# Patient Record
Sex: FEMALE | Race: BLACK OR AFRICAN AMERICAN | Employment: STUDENT | URBAN - METROPOLITAN AREA
[De-identification: names, ages, dates, MRNs, and addresses within clinical notes are randomized per-mention and may not be internally consistent; named-entity substitution may affect disease eponyms.]

---
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
## 2018-02-07 ENCOUNTER — OFFICE VISIT (OUTPATIENT)
Dept: SURGERY | Age: 18
End: 2018-02-07

## 2018-02-07 VITALS
HEART RATE: 74 BPM | HEIGHT: 70 IN | OXYGEN SATURATION: 99 % | SYSTOLIC BLOOD PRESSURE: 108 MMHG | TEMPERATURE: 97.2 F | BODY MASS INDEX: 18.3 KG/M2 | WEIGHT: 127.8 LBS | DIASTOLIC BLOOD PRESSURE: 73 MMHG

## 2018-02-07 DIAGNOSIS — N63.20 LEFT BREAST MASS: Primary | ICD-10-CM

## 2018-02-07 DIAGNOSIS — N89.6 INTACT HYMENAL RING: ICD-10-CM

## 2018-02-07 DIAGNOSIS — N94.6 SEVERE DYSMENORRHEA: ICD-10-CM

## 2018-02-07 RX ORDER — IBUPROFEN 800 MG/1
TABLET ORAL
Qty: 40 TAB | Refills: 5 | Status: SHIPPED | OUTPATIENT
Start: 2018-02-07

## 2018-02-07 NOTE — PROGRESS NOTES
SUBJECTIVE: Ramiro Angel is a 21 y.o. female [id number] (Abortions 0, Miscarriages 0), who presents with a lump in left breast for about 5 months and not painful. Patient's last menstrual period was 2018. No Known Allergies     History reviewed. No pertinent past medical history. Past Surgical History:   Procedure Laterality Date    HX ATRIAL SEPTAL DEFECT REPAIR         OB History     No data available          History reviewed. No pertinent family history. Social History     Social History    Marital status: SINGLE     Spouse name: N/A    Number of children: N/A    Years of education: N/A     Occupational History    Not on file. Social History Main Topics    Smoking status: Never Smoker    Smokeless tobacco: Never Used    Alcohol use No    Drug use: No    Sexual activity: No     Other Topics Concern    Not on file     Social History Narrative       Current Outpatient Prescriptions   Medication Sig Dispense Refill    ibuprofen (MOTRIN) 800 mg tablet TAKE ONE TABLET BY MOUTH EVERY 8 HOURS AS NEEDED FOR PAIN **GENERIC FOR: MOTRIN 40 Tab 5    aspirin 81 mg chewable tablet Take 81 mg by mouth daily. Review of Systems:   Constitutional: No weight change, chills or fever, anorexia, weakness or sleep disturbance . Cardiovascular: No chest pain, shortness of breath, or palpitations . Respiratory: No cough, shortness of breath, hemoptysis, or orthopnea . Neurologic: No syncope, headaches or seizures . Hematologic: No easy bruising or unusual bleeding . Psychiatric: No insomnia, confusion, depression, or anxiety . GI:No nausea and vomiting, diarrhea or constipation  . : See HPI . Musculoskeletal: No joint pain or muscle pain . Endocrine: No polydipsia, polyuria, cold intolerance, excessive fatigue, or sleep disturbance . Integumentary: No breast pain, lumps, nipple discharge, or axillary lumps .     Objective:     Visit Vitals    /73    Pulse 74    Temp 97.2 °F (36.2 °C) (Temporal)    Ht 5' 10\" (1.778 m)    Wt 127 lb 12.8 oz (58 kg)    LMP 02/01/2018    SpO2 99%    BMI 18.34 kg/m2       General:  alert, cooperative, no distress, appears stated age   Skin:  no rash or abnormalities   Eyes: negative   Mouth: MMM no lesions   Lymph Nodes:  Cervical, supraclavicular, and axillary nodes normal.   Breast Exam: Right breast negative and left breast reveals a 4-5 cm mass that is freely mobile in UOQ and slightly tender. Lungs:  clear to auscultation bilaterally   Heart:  regular rate and rhythm   Abdomen: soft, non-tender. Bowel sounds normal. No masses,  no organomegaly   Back:  Costovertebral angle tenderness absent   Genitourinary: Pelvic exam: exam declined by the patient    Extremities:  extremities normal, atraumatic, no cyanosis or edema   Neurologic:  sensation grossly intact. Psychiatric:  non focal     ASSESSMENT:      ICD-10-CM ICD-9-CM    1. Left breast mass N63.20 611.72 REFERRAL TO SURGERY   2. Severe dysmenorrhea N94.6 625.3 ibuprofen (MOTRIN) 800 mg tablet   3. Intact hymenal ring N89.6 623.3         Follow-up Disposition:  Return in about 1 year (around 2/7/2019), or if symptoms worsen or fail to improve.

## 2018-02-07 NOTE — MR AVS SNAPSHOT
[address] [id number] [phone] Patient: Coreen Gutierrez MRN: [id number] FFY:[birth year]70[birth year] Visit Information Date & Time Provider Department Dept. Phone Encounter #  
 2/7/2018  2:15 PM Carisa Flores, [facility] Surgical [address] [id number] Follow-up Instructions Return in about 1 year (around 2/7/2019), or if symptoms worsen or fail to improve. Upcoming Health Maintenance Date Due Hepatitis A Peds Age 1-18 (1 of 2 - Standard Series) 7/23/1999 DTaP/Tdap/Td series (1 - Tdap) 7/23/2005 HPV AGE 9Y-26Y (1 of 3 - Female 3 Dose Series) 7/23/2009 Influenza Age 5 to Adult 8/1/2017 Allergies as of 2/7/2018  Review Complete On: 2/7/2018 By: Carisa Flores MD  
 No Known Allergies Current Immunizations  Never Reviewed No immunizations on file. Not reviewed this visit You Were Diagnosed With   
  
 Codes Comments Left breast mass    -  Primary ICD-10-CM: N63.20 ICD-9-CM: 611.72 Severe dysmenorrhea     ICD-10-CM: N94.6 ICD-9-CM: 625.3 Intact hymenal ring     ICD-10-CM: N89.6 ICD-9-CM: 623.3 Vitals BP Pulse Temp Height(growth percentile) Weight(growth percentile) LMP  
 108/73 (33 %/ 75 %)* 74 97.2 °F (36.2 °C) (Temporal) 5' 10\" (1.778 m) (99 %, Z= 2.25) 127 lb 12.8 oz (58 kg) (50 %, Z= 0.01) 02/01/2018 SpO2 BMI OB Status Smoking Status 99% 18.34 kg/m2 (9 %, Z= -1.35) Having regular periods Never Smoker *BP percentiles are based on NHBPEP's 4th Report Growth percentiles are based on CDC 2-20 Years data. Vitals History BMI and BSA Data Body Mass Index Body Surface Area  
 18.34 kg/m 2 1.69 m 2 Preferred Pharmacy Pharmacy Name Phone [facility] [address] [phone] Your Updated Medication List  
  
   
 This list is accurate as of: 2/7/18  2:55 PM.  Always use your most recent med list.  
  
  
  
  
 aspirin 81 mg chewable tablet Take 81 mg by mouth daily. ibuprofen 800 mg tablet Commonly known as:  MOTRIN  
TAKE ONE TABLET BY MOUTH EVERY 8 HOURS AS NEEDED FOR PAIN **GENERIC FOR: MOTRIN Prescriptions Sent to Pharmacy Refills  
 ibuprofen (MOTRIN) 800 mg tablet 5 Sig: TAKE ONE TABLET BY MOUTH EVERY 8 HOURS AS NEEDED FOR PAIN **GENERIC FOR: MOTRIN Class: Normal  
 Pharmacy: [facility] [address] [facility] #: [phone] We Performed the Following REFERRAL TO SURGERY [XGB740 Custom] Comments:  
 Please evaluate patient for left breast mass. Follow-up Instructions Return in about 1 year (around 2/7/2019), or if symptoms worsen or fail to improve. Referral Information Referral ID Referred By Referred To  
  
 [id number] Cassy LOCKETT MD   
   [facility] [place] [address] [facility] Phone: [phone] Fax: [phone] Visits Status Start Date End Date 1 New Request 2/7/18 2/7/19 If your referral has a status of pending review or denied, additional information will be sent to support the outcome of this decision. Introducing [facility] & HEALTH SERVICES! Anamika Almonte introduces [employer] patient portal. Now you can access parts of your medical record, email your doctor's office, and request medication refills online. 1. In your internet browser, go to [email or web address] 2. Click on the First Time User? Click Here link in the Sign In box. You will see the New Member Sign Up page. 3. Enter your [employer] Access Code exactly as it appears below. You will not need to use this code after youve completed the sign-up process. If you do not sign up before the expiration date, you must request a new code. · Adyuka Access Code: [email or web address] Expires: 5/8/2018  2:34 PM 
 
4. Enter the last four digits of your Social Security Number (xxxx) and Date of Birth (mm/dd/yyyy) as indicated and click Submit. You will be taken to the next sign-up page. 5. Create a Adyuka ID. This will be your Adyuka login ID and cannot be changed, so think of one that is secure and easy to remember. 6. Create a Adyuka password. You can change your password at any time. 7. Enter your Password Reset Question and Answer. This can be used at a later time if you forget your password. 8. Enter your e-mail address. You will receive e-mail notification when new information is available in [address] 9. Click Sign Up. You can now view and download portions of your medical record. 10. Click the Download Summary menu link to download a portable copy of your medical information. If you have questions, please visit the Frequently Asked Questions section of the Adyuka website. Remember, Adyuka is NOT to be used for urgent needs. For medical emergencies, dial 911. Now available from your iPhone and Android! Please provide this summary of care documentation to your next provider. If you have any questions after today's visit, please call [phone].

## 2018-02-09 ENCOUNTER — OFFICE VISIT (OUTPATIENT)
Dept: SURGERY | Age: 18
End: 2018-02-09

## 2018-02-09 VITALS
WEIGHT: 123 LBS | SYSTOLIC BLOOD PRESSURE: 118 MMHG | OXYGEN SATURATION: 100 % | BODY MASS INDEX: 17.61 KG/M2 | HEIGHT: 70 IN | HEART RATE: 75 BPM | DIASTOLIC BLOOD PRESSURE: 82 MMHG

## 2018-02-09 DIAGNOSIS — N63.21 MASS OF UPPER OUTER QUADRANT OF LEFT BREAST: Primary | ICD-10-CM

## 2018-02-09 RX ORDER — DOXYCYCLINE 100 MG/1
100 CAPSULE ORAL 2 TIMES DAILY
COMMUNITY
End: 2018-04-18 | Stop reason: ALTCHOICE

## 2018-02-09 RX ORDER — HYDROXYCHLOROQUINE SULFATE 200 MG/1
200 TABLET, FILM COATED ORAL DAILY
COMMUNITY

## 2018-02-09 NOTE — PROGRESS NOTES
Limited Ultrasound of the left breast    Procedure:  Ultrasound of left breast  Indication:  left breast mass at 2 o'clock   Surgeon:  Mumtaz Francois MD FACS  Procedure:  Utilizing a Tinychat Nemio 20 high frequency linear array transducer the left  breast was scanned and images obtained with special attention to the 2 o'clock position  Findings:  Hypoechoic homogeneous smoothly marginated 4.1 x 1.7 x 3.6 cm mass with bilateral shadowing at 2 o'clock 9 centimeters from the nipple  Impression:  Probably benign  BIRADS: 3-4  Recommend:  Observation with repeat US in 6 months vs core biopsy at patient discretion    Mumtaz Francois MD FACS

## 2018-02-09 NOTE — PROGRESS NOTES
HISTORY OF PRESENT ILLNESS  Bo De Guzman is a 21 y.o. female who comes in for consultation by Dr Casey Borges for a breast mass  HPI  She has noted a lump in the UOQ of the left breast for 4-5 months. It has not changed in size and is slightly tender. The size nor discomfort does not vary with menstrual cycle. She denies family hx breast cancer, ovarian cancer. She had menarche at 3, and is nulliparous. Her LMP was 2/1. Past Medical History:   Diagnosis Date    Arthritis     Autoimmune disease ([address]     Chronic pain     Psychotic disorder      Past Surgical History:   Procedure Laterality Date    HX ATRIAL SEPTAL DEFECT REPAIR  2003     Family History   Problem Relation Age of Onset   [facility] Stroke Mother     Hypertension Sister      Social History   Substance Use Topics    Smoking status: Never Smoker    Smokeless tobacco: Never Used    Alcohol use No     Current Outpatient Prescriptions   Medication Sig    doxycycline (MONODOX) 100 mg capsule Take 100 mg by mouth two (2) times a day.  hydroxychloroquine (PLAQUENIL) 200 mg tablet Take 200 mg by mouth daily.  ibuprofen (MOTRIN) 800 mg tablet TAKE ONE TABLET BY MOUTH EVERY 8 HOURS AS NEEDED FOR PAIN **GENERIC FOR: MOTRIN    aspirin 81 mg chewable tablet Take 81 mg by mouth daily. No current facility-administered medications for this visit. Allergies   Allergen Reactions    Latex Rash       Review of Systems   Constitutional: Negative for chills, diaphoresis, fever, malaise/fatigue and weight loss. HENT: Negative for congestion, ear pain and sore throat. Eyes: Negative for blurred vision and pain. Respiratory: Positive for shortness of breath. Negative for cough, hemoptysis, sputum production, wheezing and stridor. Cardiovascular: Negative for chest pain, palpitations, orthopnea, claudication, leg swelling and PND. Gastrointestinal: Positive for heartburn.  Negative for abdominal pain, blood in stool, constipation, diarrhea, melena, nausea and vomiting. Genitourinary: Negative for dysuria, flank pain, frequency, hematuria and urgency. Musculoskeletal: Positive for myalgias. Negative for back pain, joint pain and neck pain. Skin: Negative for itching and rash. Neurological: Positive for headaches. Negative for dizziness, tremors, focal weakness, seizures and weakness. Endo/Heme/Allergies: Negative for polydipsia. Psychiatric/Behavioral: Negative for depression and memory loss. The patient is nervous/anxious. Visit Vitals    /82 (BP 1 Location: Right arm, BP Patient Position: Sitting)    Pulse 75    Ht 5' 10\" (1.778 m)    Wt 55.8 kg (123 lb)    LMP 02/01/2018    SpO2 100%    BMI 17.65 kg/m2       Physical Exam   Constitutional: She is oriented to person, place, and time. She appears well-developed and well-nourished. No distress. HENT:   Head: Normocephalic and atraumatic. Mouth/Throat: Oropharynx is clear and moist. No oropharyngeal exudate. Eyes: Conjunctivae and EOM are normal. Pupils are equal, round, and reactive to light. No scleral icterus. Neck: Normal range of motion. Neck supple. No JVD present. No tracheal deviation present. No thyromegaly present. Cardiovascular: Normal rate and regular rhythm. Exam reveals no gallop and no friction rub. No murmur heard. Pulmonary/Chest: Effort normal and breath sounds normal. No respiratory distress. She has no wheezes. She has no rales. Right breast exhibits no inverted nipple, no mass, no nipple discharge, no skin change and no tenderness. Left breast exhibits mass (5 cm mobile mass in UOQ). Left breast exhibits no inverted nipple, no nipple discharge, no skin change and no tenderness. Breasts are symmetrical (medium, ptotic). Abdominal: Soft. Bowel sounds are normal. She exhibits no distension and no mass. There is no tenderness. There is no rebound and no guarding. Musculoskeletal: Normal range of motion. She exhibits no edema. Lymphadenopathy:     She has no cervical adenopathy. She has axillary adenopathy (shoddy bilaterally ). Right: No supraclavicular adenopathy present. Left: No supraclavicular adenopathy present. Neurological: She is alert and oriented to person, place, and time. No cranial nerve deficit. Skin: Skin is warm and dry. No rash noted. She is not diaphoretic. No erythema. No pallor. Psychiatric: Her speech is normal and behavior is normal. Judgment and thought content normal. Her mood appears anxious. ASSESSMENT and PLAN  1. Left breast mass 0200. I explained to her and her mother about the anatomy and pathophysiology of breast masses and likely benign process in a young woman but the possibility of malignancy. Options for observation, further work up, possible biopsy and possible excision were discussed. 2.  Anxiety    We did an US in the office today suggestion a solid mass. This is most likely a fibroadenoma or phylloides tumor.   She desires a biopsy but is going on a trip and we will arrange US core bx on her return in the office    My Pina MD FACS

## 2018-02-09 NOTE — MR AVS SNAPSHOT
[address] [facility], [address] [place] [address] [facility] 
[phone] Patient: Coreen Gutierrez MRN: [id number] GMS:[birth year] Visit Information Date & Time Provider Department Dept. Phone Encounter #  
 2/9/2018  9:20 AM Kingsley Benson MD [facility] [place] [address] [id number] Your Appointments 3/2/2018  1:30 PM  
OFFICE SURGERY with Kingsley Benson MD  
[facility] Dr. Kip Davis [facility] ([address]) Appt Note: ultrasound guided core biopsy of the Lt breast  
 [address] [facility], [address] [place] [address], [address] [phone]  
[address] [facility], [address] [place] [address] [address] [phone] Upcoming Health Maintenance Date Due Hepatitis A Peds Age 1-18 (1 of 2 - Standard Series) 7/23/1999 DTaP/Tdap/Td series (1 - Tdap) 7/23/2005 HPV AGE 9Y-26Y (1 of 3 - Female 3 Dose Series) 7/23/2009 Influenza Age 5 to Adult 8/1/2017 Allergies as of 2/9/2018  Review Complete On: 2/9/2018 By: Kingsley Benson MD  
  
 Severity Noted Reaction Type Reactions Latex  02/09/2018    Rash Current Immunizations  Never Reviewed No immunizations on file. Not reviewed this visit Vitals BP Pulse Height(growth percentile) Weight(growth percentile) LMP  
 118/82 (69 %/ 93 %)* (BP 1 Location: Right arm, BP Patient Position: Sitting) 75 5' 10\" (1.778 m) (99 %, Z= 2.25) 123 lb (55.8 kg) (41 %, Z= -0.23) 02/01/2018 SpO2 BMI OB Status Smoking Status 100% 17.65 kg/m2 (4 %, Z= -1.74) Having regular periods Never Smoker *BP percentiles are based on NHBPEP's 4th Report Growth percentiles are based on CDC 2-20 Years data. Vitals History BMI and BSA Data Body Mass Index Body Surface Area  
 17.65 kg/m 2 1.66 m 2 Preferred Pharmacy Pharmacy Name Phone [facility] [facility] [phone] Your Updated Medication List  
  
   
This list is accurate as of: 18 10:05 AM.  Always use your most recent med list.  
  
  
  
  
 aspirin 81 mg chewable tablet Take 81 mg by mouth daily. doxycycline 100 mg capsule Commonly known as:  Blondie England Take 100 mg by mouth two (2) times a day. ibuprofen 800 mg tablet Commonly known as:  MOTRIN  
TAKE ONE TABLET BY MOUTH EVERY 8 HOURS AS NEEDED FOR PAIN **GENERIC FOR: MOTRIN  
  
 PLAQUENIL 200 mg tablet Generic drug:  hydroxychloroquine Take 200 mg by mouth daily. Introducing [facility] & HEALTH SERVICES! Candy Lara introduces [email or web address] patient portal. Now you can access parts of your medical record, email your doctor's office, and request medication refills online. 1. In your internet browser, go to [email or web address] 2. Click on the First Time User? Click Here link in the Sign In box. You will see the New Member Sign Up page. 3. Enter your [email or web address] Access Code exactly as it appears below. You will not need to use this code after youve completed the sign-up process. If you do not sign up before the expiration date, you must request a new code. · [email or web address] Access Code: [email or web address] Expires: 2018  2:34 PM 
 
4. Enter the last four digits of your Social Security Number (xxxx) and Date of Birth (mm/dd/yyyy) as indicated and click Submit. You will be taken to the next sign-up page. 5. Create a [email or web address] ID. This will be your [email or web address] login ID and cannot be changed, so think of one that is secure and easy to remember. 6. Create a [email or web address] password. You can change your password at any time. 7. Enter your Password Reset Question and Answer. This can be used at a later time if you forget your password. 8. Enter your e-mail address. You will receive e-mail notification when new information is available in [address] 9. Click Sign Up. You can now view and download portions of your medical record. 10. Click the Download Summary menu link to download a portable copy of your medical information. If you have questions, please visit the Frequently Asked Questions section of the Aeromics website. Remember, Aer[employer] is NOT to be used for urgent needs. For medical emergencies, dial 911. Now available from your iPhone and Android! Please provide this summary of care documentation to your next provider. Your primary care clinician is listed as Shonna Davidson. If you have any questions after today's visit, please call [phone].

## 2018-03-01 ENCOUNTER — TELEPHONE (OUTPATIENT)
Dept: SURGERY | Age: 18
End: 2018-03-01

## 2018-03-01 NOTE — TELEPHONE ENCOUNTER
Spoke with patient's mother and moved her appt for breast biopsy 11am vs 1:30pm on 03/02/2018. She verbalized understanding.

## 2018-03-02 ENCOUNTER — OFFICE VISIT (OUTPATIENT)
Dept: SURGERY | Age: 18
End: 2018-03-02

## 2018-03-02 ENCOUNTER — TELEPHONE (OUTPATIENT)
Dept: SURGERY | Age: 18
End: 2018-03-02

## 2018-03-02 ENCOUNTER — HOSPITAL ENCOUNTER (OUTPATIENT)
Dept: LAB | Age: 18
Discharge: HOME | End: 2018-03-02

## 2018-03-02 VITALS
HEIGHT: 70 IN | OXYGEN SATURATION: 99 % | DIASTOLIC BLOOD PRESSURE: 65 MMHG | WEIGHT: 122 LBS | SYSTOLIC BLOOD PRESSURE: 122 MMHG | BODY MASS INDEX: 17.47 KG/M2 | HEART RATE: 70 BPM

## 2018-03-02 DIAGNOSIS — N63.21 MASS OF UPPER OUTER QUADRANT OF LEFT BREAST: Primary | ICD-10-CM

## 2018-03-02 NOTE — PATIENT INSTRUCTIONS
Post op instructions after core biopsy    Wear ace wrap for 24 hours. Then ok to remove it and take a shower. Keep top dressing (clear) on until 3/6 and then remove  Once the top dressing is off keep the steristrips on for another week.   RTC 5 days    Anthony Barrera MD FACS

## 2018-03-02 NOTE — MR AVS SNAPSHOT
[address] [facility], [address] [place] [address] [facility] 
[phone] Patient: Chelsea Dailey MRN: [id number] OH:[birth year] Visit Information Date & Time Provider Department Dept. Phone Encounter #  
 3/2/2018 11:00 AM Antonia Perales MD [facility] [place] [address] [id number] Upcoming Health Maintenance Date Due Hepatitis A Peds Age 1-18 (1 of 2 - Standard Series) 7/23/1999 DTaP/Tdap/Td series (1 - Tdap) 7/23/2005 HPV AGE 9Y-26Y (1 of 3 - Female 3 Dose Series) 7/23/2009 Influenza Age 5 to Adult 8/1/2017 Allergies as of 3/2/2018  Review Complete On: 3/2/2018 By: Antonia Perales MD  
  
 Severity Noted Reaction Type Reactions Latex  02/09/2018    Rash Current Immunizations  Never Reviewed No immunizations on file. Not reviewed this visit You Were Diagnosed With   
  
 Codes Comments Mass of upper outer quadrant of left breast    -  Primary ICD-10-CM: V67.03 ICD-9-CM: 611.72 Vitals BP Pulse Height(growth percentile) Weight(growth percentile) LMP  
 122/65 (82 %/ 48 %)* (BP 1 Location: Right arm, BP Patient Position: Sitting) 70 5' 10\" (1.778 m) (99 %, Z= 2.25) 122 lb (55.3 kg) (39 %, Z= -0.29) 02/01/2018 SpO2 BMI OB Status Smoking Status 99% 17.51 kg/m2 (3 %, Z= -1.83) Having regular periods Never Smoker *BP percentiles are based on NHBPEP's 4th Report Growth percentiles are based on CDC 2-20 Years data. Vitals History BMI and BSA Data Body Mass Index Body Surface Area  
 17.51 kg/m 2 1.65 m 2 Preferred Pharmacy Pharmacy Name Phone [facility] [address] [facility] [phone] Your Updated Medication List  
  
   
This list is accurate as of 3/2/18 11:47 AM.  Always use your most recent med list.  
  
  
  
  
 aspirin 81 mg chewable tablet Take 81 mg by mouth daily. doxycycline 100 mg capsule Commonly known as:  Daniel Cerise Take 100 mg by mouth two (2) times a day. ibuprofen 800 mg tablet Commonly known as:  MOTRIN  
TAKE ONE TABLET BY MOUTH EVERY 8 HOURS AS NEEDED FOR PAIN **GENERIC FOR: MOTRIN  
  
 PLAQUENIL 200 mg tablet Generic drug:  hydroxychloroquine Take 200 mg by mouth daily. Patient Instructions Post op instructions after core biopsy Wear ace wrap for 24 hours. Then ok to remove it and take a shower. Keep top dressing (clear) on until 3/6 and then remove Once the top dressing is off keep the steristrips on for another week. RTC 5 days Wong Maier MD FACS Introducing [facility] & HEALTH SERVICES! [facility] introduces [employer] patient portal. Now you can access parts of your medical record, email your doctor's office, and request medication refills online. 1. In your internet browser, go to [email or web address] 2. Click on the First Time User? Click Here link in the Sign In box. You will see the New Member Sign Up page. 3. Enter your V[employer] Closure Access Code exactly as it appears below. You will not need to use this code after youve completed the sign-up process. If you do not sign up before the expiration date, you must request a new code. · V[employer] Access Code: [email or web address] Expires: 5/8/2018  2:34 PM 
 
4. Enter the last four digits of your Social Security Number (xxxx) and Date of Birth (mm/dd/yyyy) as indicated and click Submit. You will be taken to the next sign-up page. 5. Create a Triplt ID. This will be your V[employer] Closure login ID and cannot be changed, so think of one that is secure and easy to remember. 6. Create a V[employer] Closure password. You can change your password at any time. 7. Enter your Password Reset Question and Answer. This can be used at a later time if you forget your password. 8. Enter your e-mail address. You will receive e-mail notification when new information is available in [address] 9. Click Sign Up. You can now view and download portions of your medical record. 10. Click the Download Summary menu link to download a portable copy of your medical information. If you have questions, please visit the Frequently Asked Questions section of the OptiNose website. Remember, OptiNose is NOT to be used for urgent needs. For medical emergencies, dial 911. Now available from your iPhone and Android! Please provide this summary of care documentation to your next provider. Your primary care clinician is listed as Olimpia Vargas. If you have any questions after today's visit, please call [phone].

## 2018-03-02 NOTE — PROGRESS NOTES
Procedure Note    Pre Procedure Diagnosis:  Left breast mass 0200  Post Procedure Diagnosis:  Left breast mass 0200  Procedure:  1. Ultrasound guided vacuum assisted core biopsy of left breast mass 0200                      2.  Ultrasound guided marker/clip placement left breast  Surgeon:   Seema Newman MD FACS  Local 10 ml 1% lidocaine with epi  EBL minimal  SPECIMEN:   left breast mass    Procedure:  After informed consent and time out, the left breast was prepped with chlorhexidine. Using ultrasound guidance, local anesthesia was injected into the dermis and subcutaneous tissues adjacent to the left breast hypoechoic mass noted on ultrasound. A small stab incision was made and using an 8 gauge Mammotome vacuum assisted core biopsy device and ultrasound guidance 5 cores were taken from a lateral to medial approach. Pictures were taken to document this procedure. Next, again using ultrasound guidance a HydroMark 8 clip was placed in the biopsy cavity. Pressure was held for ten minutes due to bleeding and then steristrips and a sterile dressing was applied. There appeared to be no further bleeding. The patient tolerated the procedure well.       Signed  Seema Newman MD FACS

## 2018-03-02 NOTE — TELEPHONE ENCOUNTER
Spoke with patients mother breast dressing ace wrap and clothes saturated with blood. S/P core biopsy in office today. Spoke with provider instructed patient to return  to office.

## 2018-03-02 NOTE — PROGRESS NOTES
1. Have you been to the ER, urgent care clinic since your last visit? Hospitalized since your last visit? No    2. Have you seen or consulted any other health care providers outside of the [address] [place] [address] since your last visit? Include any pap smears or colon screening.  No

## 2018-03-02 NOTE — PROGRESS NOTES
[facility]  OFFICE PROCEDURE PROGRESS NOTE        Chart reviewed for the following:   Abel YANZE LPN, have reviewed the History, Physical and updated the Allergic reactions for [facility] performed immediately prior to start of procedure:   Sandy Jett LPN, have performed the following reviews [facility] prior to the start of the procedure:            * Patient was identified by name and date of birth   * Agreement on procedure being performed was verified  * Risks and Benefits explained to the patient  * Procedure site verified and marked as necessary  * Patient was positioned for comfort  * Consent was signed and verified     Time: 11:30      Date of procedure: 3/2/2018    Procedure performed by:  Oneta Burkitt, MD    Provider assisted by: Abel Suarez LPN    Patient assisted by: mother    How tolerated by patient: tolerated the procedure well with no complications    Post Procedural Pain Scale: 0 - No Hurt    Comments: none

## 2018-03-15 ENCOUNTER — OFFICE VISIT (OUTPATIENT)
Dept: SURGERY | Age: 18
End: 2018-03-15

## 2018-03-15 VITALS — DIASTOLIC BLOOD PRESSURE: 79 MMHG | TEMPERATURE: 96 F | HEART RATE: 79 BPM | SYSTOLIC BLOOD PRESSURE: 129 MMHG

## 2018-03-15 DIAGNOSIS — D24.2 FIBROADENOMA OF LEFT BREAST: Primary | ICD-10-CM

## 2018-03-15 NOTE — PROGRESS NOTES
HISTORY OF PRESENT ILLNESS  Omer Mario is a 21 y.o. female who comes in for consultation by  [address] [place] for a breast mass  Surgical Follow-up   Associated symptoms include headaches and shortness of breath. Pertinent negatives include no chest pain and no abdominal pain. She has noted a lump in the UOQ of the left breast for 4-5 months. It has not changed in size and is slightly tender. The size nor discomfort does not vary with menstrual cycle. She denies family hx breast cancer, ovarian cancer. She had menarche at 3, and is nulliparous. Her LMP was 2/1. US core bx 3/2/2018 demonstrated a fibroadenoma. Past Medical History:   Diagnosis Date    Arthritis     Autoimmune disease ([address]     Chronic pain     Psychotic disorder      Past Surgical History:   Procedure Laterality Date    BREAST SURGERY PROCEDURE UNLISTED  03/02/2018    Breast bx     HX ATRIAL SEPTAL DEFECT REPAIR  2003     Family History   Problem Relation Age of Onset   Janusz Degroot Stroke Mother     Hypertension Sister      Social History   Substance Use Topics    Smoking status: Never Smoker    Smokeless tobacco: Never Used    Alcohol use No     Current Outpatient Prescriptions   Medication Sig    doxycycline (MONODOX) 100 mg capsule Take 100 mg by mouth two (2) times a day.  ibuprofen (MOTRIN) 800 mg tablet TAKE ONE TABLET BY MOUTH EVERY 8 HOURS AS NEEDED FOR PAIN **GENERIC FOR: MOTRIN    aspirin 81 mg chewable tablet Take 81 mg by mouth daily.  hydroxychloroquine (PLAQUENIL) 200 mg tablet Take 200 mg by mouth daily. No current facility-administered medications for this visit. Allergies   Allergen Reactions    Latex Rash       Review of Systems   Constitutional: Negative for chills, diaphoresis, fever, malaise/fatigue and weight loss. HENT: Negative for congestion, ear pain and sore throat. Eyes: Negative for blurred vision and pain. Respiratory: Positive for shortness of breath.  Negative for cough, hemoptysis, sputum production, wheezing and stridor. Cardiovascular: Negative for chest pain, palpitations, orthopnea, claudication, leg swelling and PND. Gastrointestinal: Positive for heartburn. Negative for abdominal pain, blood in stool, constipation, diarrhea, melena, nausea and vomiting. Genitourinary: Negative for dysuria, flank pain, frequency, hematuria and urgency. Musculoskeletal: Positive for myalgias. Negative for back pain, joint pain and neck pain. Skin: Negative for itching and rash. Neurological: Positive for headaches. Negative for dizziness, tremors, focal weakness, seizures and weakness. Endo/Heme/Allergies: Negative for polydipsia. Psychiatric/Behavioral: Negative for depression and memory loss. The patient is nervous/anxious. Visit Vitals    /79 (BP 1 Location: Right arm, BP Patient Position: Sitting)    Pulse 79    Temp 96 °F (35.6 °C) (Oral)       Physical Exam   Constitutional: She is oriented to person, place, and time. She appears well-developed and well-nourished. No distress. HENT:   Head: Normocephalic and atraumatic. Mouth/Throat: Oropharynx is clear and moist. No oropharyngeal exudate. Eyes: Conjunctivae and EOM are normal. Pupils are equal, round, and reactive to light. No scleral icterus. Neck: Normal range of motion. Neck supple. No JVD present. No tracheal deviation present. No thyromegaly present. Cardiovascular: Normal rate and regular rhythm. Exam reveals no gallop and no friction rub. No murmur heard. Pulmonary/Chest: Effort normal and breath sounds normal. No respiratory distress. She has no wheezes. She has no rales. Right breast exhibits no inverted nipple, no mass, no nipple discharge, no skin change and no tenderness. Left breast exhibits mass (5 cm mobile mass in UOQ). Left breast exhibits no inverted nipple, no nipple discharge, no skin change and no tenderness. Breasts are symmetrical (medium, ptotic). Abdominal: Soft.  Bowel sounds are normal. She exhibits no distension and no mass. There is no tenderness. There is no rebound and no guarding. Musculoskeletal: Normal range of motion. She exhibits no edema. Lymphadenopathy:     She has no cervical adenopathy. She has axillary adenopathy (shoddy bilaterally ). Right: No supraclavicular adenopathy present. Left: No supraclavicular adenopathy present. Neurological: She is alert and oriented to person, place, and time. No cranial nerve deficit. Skin: Skin is warm and dry. No rash noted. She is not diaphoretic. No erythema. No pallor. Psychiatric: Her speech is normal and behavior is normal. Judgment and thought content normal. Her mood appears anxious. ASSESSMENT and PLAN  1. Left breast mass 0200 biopsy proven fibroadenoma. It is causing her pain and she desires removal.  Risks of removal include, but are not limited to, bleeding, infection, recurrence, poor healing/cosmesis, as well as usual risks of anesthesia.   2.  Anxiety    She desires a left breast excisional biopsy under MAC/general per her choice as an outpatient    Ghada Hicks MD FACS

## 2018-03-15 NOTE — PROGRESS NOTES
1. Have you been to the ER, urgent care clinic since your last visit? Hospitalized since your last visit?no    2. Have you seen or consulted any other health care providers outside of the [place] [address] since your last visit? Include any pap smears or colon screening.  no

## 2018-03-15 NOTE — PATIENT INSTRUCTIONS
Surgery Instruction Sheet    You have been scheduled for surgery on 04/09/2018 at 1:00pm at [facility]. Please report to the [address] at 12:00pm, this is approximately 1 hours prior to your surgery time. The Ambulatory Surgery Center is located on the [facility] [address] side of the hospital, just next to the Emergency Room. Reserved parking is available and  parking if lot is full. You will not need to have a pre-op visit before surgery, but the Pre-op Nurse will call you before surgery. The Pre-op nurse will review your medical history, medications and give you additional instructions. They will also confirm your arrival time. Call your physician immediately if you notice a change in your health between the time you saw your physician and the day of surgery. If you take a blood thinner, please let us know. Call your ordering Doctor to make sure you can stop taking it prior to your surgery. STOP YOUR ASPIRIN 10 DAYS PRIOR TO SURGERY. DO NOT TAKE  IBUPROFEN, ADVIL, MOTRIN, ALEVE, EXCEDRIN, BC POWDER, GOODIES, FISH OIL OR ANY MEDICATION CONTAINING ASPIRIN 10 DAYS PRIOR TO YOUR SURGERY. MAY TAKE TYLENOL. Eat a light dinner the evening before your surgery. DO NOT EAT OR DRINK ANYTHING AFTER MIDNIGHT THE NIGHT BEFORE YOUR SURGERY. This includes water, chewing gum, lifesavers, etc.  The Pre op nurse will check with you about any medication that you may need to take the morning of surgery. Shower with a new bar of anti-bacterial soap (Dial, Safeguard) or solution given to you by Pre-op, the night before surgery. Do not use lotion, powder, deodorant on the skin after showering.   Wear loose, comfortable clothing the day of surgery and bring a container to store your contacts, eyeglasses, dentures, hearing aid, etc.  Do not bring money, valuables, jewelry, etc. to the hospital.      If you are having outpatient surgery, someone must come with you the morning of surgery to drive you home. You can not drive for 24 hours after any anesthesia. Sometimes it is necessary to stay overnight and leave the next morning. This is still considered outpatient for most insurance deductibles. Someone will still need to drive you home. If you have questions or concerns, please feel free to call Dr Reina Acevedo at [phone]. If you need to cancel your surgery, please call as soon as possible.

## 2018-03-15 NOTE — MR AVS SNAPSHOT
[address] [facility] [address], [address] [facility], [address] [place] [address] [facility] 
[phone] Patient: Derick Moreno MRN: [id number] Visit Information Date & Time Provider Department Dept. Phone Encounter #  
 3/15/2018  4:00 PM Radha Wood MD [facility] [place] [address] [id number] Your Appointments 4/16/2018 11:20 AM  
POST OP with Radha Wood MD  
[facility] Dr. Kip Davis [facility] Appt Note: post op Lt breast excisional biopsy 04/09/2018  
 [address] [facility], [address] [facility], [address] [address]  
[address] [facility], [address] [place] [address] [address] [phone] Upcoming Health Maintenance Date Due Hepatitis A Peds Age 1-18 (1 of 2 - Standard Series) 7/23/1999 DTaP/Tdap/Td series (1 - Tdap) 7/23/2005 HPV AGE 9Y-26Y (1 of 3 - Female 3 Dose Series) 7/23/2009 Influenza Age 5 to Adult 8/1/2017 Allergies as of 3/15/2018  Review Complete On: 3/15/2018 By: Radha Wood MD  
  
 Severity Noted Reaction Type Reactions Latex  02/09/2018    Rash Current Immunizations  Never Reviewed No immunizations on file. Not reviewed this visit You Were Diagnosed With   
  
 Codes Comments Fibroadenoma of left breast    -  Primary ICD-10-CM: D24.2 ICD-9-CM: 344 Vitals BP Pulse Temp OB Status Smoking Status 129/79 (94 %/ 89 %)* (BP 1 Location: Right arm, BP Patient Position: Sitting) 79 96 °F (35.6 °C) (Oral) Having regular periods Never Smoker *BP percentiles are based on NHBPEP's 4th Report Preferred Pharmacy Pharmacy Name Phone [facility] [phone] Your Updated Medication List  
  
   
This list is accurate as of 3/15/18  4:25 PM.  Always use your most recent med list.  
  
  
  
  
 aspirin 81 mg chewable tablet Take 81 mg by mouth daily. doxycycline 100 mg capsule Commonly known as:  Priyanka Jumper Take 100 mg by mouth two (2) times a day. ibuprofen 800 mg tablet Commonly known as:  MOTRIN  
TAKE ONE TABLET BY MOUTH EVERY 8 HOURS AS NEEDED FOR PAIN **GENERIC FOR: MOTRIN  
  
 PLAQUENIL 200 mg tablet Generic drug:  hydroxychloroquine Take 200 mg by mouth daily. Patient Instructions Surgery Instruction Sheet You have been scheduled for surgery on 04/09/2018 at 1:00pm at [facility]. Please report to the [address] [place] [address] at 12:00pm, this is approximately 1 hours prior to your surgery time. The Ambulatory Surgery Center is located on the [address] [place] [address] Street side of the hospital, just next to the Emergency Room. Reserved parking is available and  parking if lot is full. You will not need to have a pre-op visit before surgery, but the Pre-op Nurse will call you before surgery. The Pre-op nurse will review your medical history, medications and give you additional instructions. They will also confirm your arrival time. Call your physician immediately if you notice a change in your health between the time you saw your physician and the day of surgery. If you take a blood thinner, please let us know. Call your ordering Doctor to make sure you can stop taking it prior to your surgery. STOP YOUR ASPIRIN 10 DAYS PRIOR TO SURGERY. DO NOT TAKE  IBUPROFEN, ADVIL, MOTRIN, ALEVE, EXCEDRIN, BC POWDER, GOODIES, FISH OIL OR ANY MEDICATION CONTAINING ASPIRIN 10 DAYS PRIOR TO YOUR SURGERY. MAY TAKE TYLENOL. Eat a light dinner the evening before your surgery. DO NOT EAT OR DRINK ANYTHING AFTER MIDNIGHT THE NIGHT BEFORE YOUR SURGERY. This includes water, chewing gum, lifesavers, etc.  The Pre op nurse will check with you about any medication that you may need to take the morning of surgery. Shower with a new bar of anti-bacterial soap (Dial, Safeguard) or solution given to you by Pre-op, the night before surgery. Do not use lotion, powder, deodorant on the skin after showering. Wear loose, comfortable clothing the day of surgery and bring a container to store your contacts, eyeglasses, dentures, hearing aid, etc.  Do not bring money, valuables, jewelry, etc. to the hospital.   
 
If you are having outpatient surgery, someone must come with you the morning of surgery to drive you home. You can not drive for 24 hours after any anesthesia. Sometimes it is necessary to stay overnight and leave the next morning. This is still considered outpatient for most insurance deductibles. Someone will still need to drive you home. If you have questions or concerns, please feel free to call Dr Fany Beckett at [phone]. If you need to cancel your surgery, please call as soon as possible. Introducing [facility]! Erma Downing introduces Markerly patient portal. Now you can access parts of your medical record, email your doctor's office, and request medication refills online. 1. In your internet browser, go to [email or web address] 2. Click on the First Time User? Click Here link in the Sign In box. You will see the New Member Sign Up page. 3. Enter your Markerly Access Code exactly as it appears below. You will not need to use this code after youve completed the sign-up process. If you do not sign up before the expiration date, you must request a new code. · Markerly Access Code: [email or web address] Expires: 5/8/2018  3:34 PM 
 
4. Enter the last four digits of your Social Security Number (xxxx) and Date of Birth (mm/dd/yyyy) as indicated and click Submit. You will be taken to the next sign-up page. 5. Create a JewelStreett ID. This will be your Markerly login ID and cannot be changed, so think of one that is secure and easy to remember. 6. Create a Webroot password. You can change your password at any time. 7. Enter your Password Reset Question and Answer. This can be used at a later time if you forget your password. 8. Enter your e-mail address. You will receive e-mail notification when new information is available in [address] 9. Click Sign Up. You can now view and download portions of your medical record. 10. Click the Download Summary menu link to download a portable copy of your medical information. If you have questions, please visit the Frequently Asked Questions section of the Webroot website. Remember, Webroot is NOT to be used for urgent needs. For medical emergencies, dial 911. Now available from your iPhone and Android! Please provide this summary of care documentation to your next provider. Your primary care clinician is listed as Giovany Driscoll. If you have any questions after today's visit, please call [phone].

## 2018-04-06 NOTE — PERIOP NOTES
[facility]  Ambulatory Surgery Unit  Pre-operative Instructions    Surgery/Procedure Date  04/09/2018            Tentative Arrival Time 1230      1. On the day of your surgery/procedure, please report to the Ambulatory Surgery Unit Registration Desk and sign in at your designated time. The Ambulatory Surgery Unit is located in [facility] on the [facility] side of the Hospitals in [place] across from the [facility]. Please have all of your health insurance cards and a photo ID. 2. You must have someone with you to drive you home, as you should not drive a car for 24 hours following anesthesia. Please make arrangements for a responsible adult friend or family member to stay with you for at least the first 24 hours after your surgery. 3. Do not have anything to eat or drink (including water, gum, mints, coffee, juice) after midnight   04/08/2018. This may not apply to medications prescribed by your physician. (Please note below the special instructions with medications to take the morning of surgery, if applicable.)    4. We recommend you do not drink any alcoholic beverages for 24 hours before and after your surgery. 5. Contact your surgeons office for instructions on the following medications: non-steroidal anti-inflammatory drugs (i.e. Advil, Aleve), vitamins, and supplements. (Some surgeons will want you to stop these medications prior to surgery and others may allow you to take them)   **If you are currently taking Plavix, Coumadin, Aspirin and/or other blood-thinning agents, contact your surgeon for instructions. ** Your surgeon will partner with the physician prescribing these medications to determine if it is safe to stop or if you need to continue taking. Please do not stop taking these medications without instructions from your surgeon.     6. In an effort to help prevent surgical site infection, we ask that you shower with an anti-bacterial soap (i.e. Dial or Safeguard) for 3 days prior to and on the morning of surgery, using a fresh towel after each shower. (Please begin this process with fresh bed linens.) Do not apply any lotions, powders, or deodorants after the shower on the day of your procedure. If applicable, please do not shave the operative site for 48 hours prior to surgery. 7. Wear comfortable clothes. Wear glasses instead of contacts. Do not bring any jewelry or money (other than copays or fees as instructed). Do not wear make-up, particularly mascara, the morning of your surgery. Do not wear nail polish, particularly if you are having foot /hand surgery. Wear your hair loose or down, no ponytails, buns, leighton pins or clips. All body piercings must be removed. 8. You should understand that if you do not follow these instructions your surgery may be cancelled. If your physical condition changes (i.e. fever, cold or flu) please contact your surgeon as soon as possible. 9. It is important that you be on time. If a situation occurs where you may be late, or if you have any questions or problems, please call [phone].    10. Your surgery time may be subject to change. You will receive a phone call the day prior to surgery to confirm your arrival time. 11. Pediatric patients: please bring a change of clothes, diapers, bottle/sippy cup, pacifier, etc.      Special Instructions: Take all medications and inhalers, as prescribed, on the morning of surgery with a sip of water. I understand a pre-operative phone call will be made to verify my surgery time. In the event that I am not available, I give permission for a message to be left on my answering service and/or with another person? yes         ___________________      ___________________      ________________  Pt and mother verbalized understanding of preop instructions via telephone.   (Signature of Patient)          (Witness)                   (Date and Time)

## 2018-04-09 ENCOUNTER — HOSPITAL ENCOUNTER (OUTPATIENT)
Age: 18
Setting detail: OUTPATIENT SURGERY
Discharge: HOME | End: 2018-04-09
Attending: SURGERY | Admitting: SURGERY
Payer: COMMERCIAL

## 2018-04-09 ENCOUNTER — ANESTHESIA EVENT (OUTPATIENT)
Dept: SURGERY | Age: 18
End: 2018-04-09
Payer: COMMERCIAL

## 2018-04-09 ENCOUNTER — ANESTHESIA (OUTPATIENT)
Dept: SURGERY | Age: 18
End: 2018-04-09
Payer: COMMERCIAL

## 2018-04-09 VITALS
TEMPERATURE: 97.7 F | WEIGHT: 116 LBS | OXYGEN SATURATION: 100 % | DIASTOLIC BLOOD PRESSURE: 88 MMHG | HEART RATE: 87 BPM | SYSTOLIC BLOOD PRESSURE: 131 MMHG | HEIGHT: 68 IN | RESPIRATION RATE: 14 BRPM | BODY MASS INDEX: 17.58 KG/M2

## 2018-04-09 DIAGNOSIS — D24.2 FIBROADENOMA OF LEFT BREAST: Primary | ICD-10-CM

## 2018-04-09 LAB — HCG UR QL: NEGATIVE

## 2018-04-09 PROCEDURE — 76060000061 HC AMB SURG ANES 0.5 TO 1 HR: Performed by: SURGERY

## 2018-04-09 PROCEDURE — 74011000250 HC RX REV CODE- 250: Performed by: SURGERY

## 2018-04-09 PROCEDURE — 76030000000 HC AMB SURG OR TIME 0.5 TO 1: Performed by: SURGERY

## 2018-04-09 PROCEDURE — 74011250636 HC RX REV CODE- 250/636

## 2018-04-09 PROCEDURE — 74011250636 HC RX REV CODE- 250/636: Performed by: ANESTHESIOLOGY

## 2018-04-09 PROCEDURE — 77030020255 HC SOL INJ LR 1000ML BG: Performed by: SURGERY

## 2018-04-09 PROCEDURE — 77030010507 HC ADH SKN DERMBND J&J -B: Performed by: SURGERY

## 2018-04-09 PROCEDURE — 77030011267 HC ELECTRD BLD COVD -A: Performed by: SURGERY

## 2018-04-09 PROCEDURE — 77030018836 HC SOL IRR NACL ICUM -A: Performed by: SURGERY

## 2018-04-09 PROCEDURE — 76210000040 HC AMBSU PH I REC FIRST 0.5 HR: Performed by: SURGERY

## 2018-04-09 PROCEDURE — 77030021352 HC CBL LD SYS DISP COVD -B: Performed by: SURGERY

## 2018-04-09 PROCEDURE — 77030031139 HC SUT VCRL2 J&J -A: Performed by: SURGERY

## 2018-04-09 PROCEDURE — 77030002996 HC SUT SLK J&J -A: Performed by: SURGERY

## 2018-04-09 PROCEDURE — 77030011640 HC PAD GRND REM COVD -A: Performed by: SURGERY

## 2018-04-09 PROCEDURE — 76210000046 HC AMBSU PH II REC FIRST 0.5 HR: Performed by: SURGERY

## 2018-04-09 PROCEDURE — 74011250636 HC RX REV CODE- 250/636: Performed by: SURGERY

## 2018-04-09 PROCEDURE — 81025 URINE PREGNANCY TEST: CPT

## 2018-04-09 PROCEDURE — 88305 TISSUE EXAM BY PATHOLOGIST: CPT | Performed by: SURGERY

## 2018-04-09 RX ORDER — DEXAMETHASONE SODIUM PHOSPHATE 4 MG/ML
INJECTION, SOLUTION INTRA-ARTICULAR; INTRALESIONAL; INTRAMUSCULAR; INTRAVENOUS; SOFT TISSUE AS NEEDED
Status: DISCONTINUED | OUTPATIENT
Start: 2018-04-09 | End: 2018-04-09 | Stop reason: HOSPADM

## 2018-04-09 RX ORDER — PROPOFOL 10 MG/ML
INJECTION, EMULSION INTRAVENOUS
Status: DISCONTINUED | OUTPATIENT
Start: 2018-04-09 | End: 2018-04-09 | Stop reason: HOSPADM

## 2018-04-09 RX ORDER — FENTANYL CITRATE 50 UG/ML
INJECTION, SOLUTION INTRAMUSCULAR; INTRAVENOUS AS NEEDED
Status: DISCONTINUED | OUTPATIENT
Start: 2018-04-09 | End: 2018-04-09 | Stop reason: HOSPADM

## 2018-04-09 RX ORDER — PROPOFOL 10 MG/ML
INJECTION, EMULSION INTRAVENOUS AS NEEDED
Status: DISCONTINUED | OUTPATIENT
Start: 2018-04-09 | End: 2018-04-09 | Stop reason: HOSPADM

## 2018-04-09 RX ORDER — OXYCODONE AND ACETAMINOPHEN 5; 325 MG/1; MG/1
1 TABLET ORAL
Qty: 20 TAB | Refills: 0 | Status: SHIPPED | OUTPATIENT
Start: 2018-04-09 | End: 2018-04-29

## 2018-04-09 RX ORDER — SODIUM CHLORIDE, SODIUM LACTATE, POTASSIUM CHLORIDE, CALCIUM CHLORIDE 600; 310; 30; 20 MG/100ML; MG/100ML; MG/100ML; MG/100ML
25 INJECTION, SOLUTION INTRAVENOUS CONTINUOUS
Status: DISCONTINUED | OUTPATIENT
Start: 2018-04-09 | End: 2018-04-09 | Stop reason: HOSPADM

## 2018-04-09 RX ORDER — CEFAZOLIN SODIUM 1 G/3ML
2 INJECTION, POWDER, FOR SOLUTION INTRAMUSCULAR; INTRAVENOUS ONCE
Status: COMPLETED | OUTPATIENT
Start: 2018-04-09 | End: 2018-04-09

## 2018-04-09 RX ORDER — MIDAZOLAM HYDROCHLORIDE 1 MG/ML
INJECTION, SOLUTION INTRAMUSCULAR; INTRAVENOUS AS NEEDED
Status: DISCONTINUED | OUTPATIENT
Start: 2018-04-09 | End: 2018-04-09 | Stop reason: HOSPADM

## 2018-04-09 RX ORDER — CEFAZOLIN SODIUM/WATER 2 G/20 ML
SYRINGE (ML) INTRAVENOUS
Status: DISCONTINUED
Start: 2018-04-09 | End: 2018-04-09 | Stop reason: HOSPADM

## 2018-04-09 RX ORDER — ONDANSETRON 2 MG/ML
INJECTION INTRAMUSCULAR; INTRAVENOUS AS NEEDED
Status: DISCONTINUED | OUTPATIENT
Start: 2018-04-09 | End: 2018-04-09 | Stop reason: HOSPADM

## 2018-04-09 RX ORDER — BUPIVACAINE HYDROCHLORIDE 5 MG/ML
20 INJECTION, SOLUTION EPIDURAL; INTRACAUDAL ONCE
Status: COMPLETED | OUTPATIENT
Start: 2018-04-09 | End: 2018-04-09

## 2018-04-09 RX ADMIN — FENTANYL CITRATE 25 MCG: 50 INJECTION, SOLUTION INTRAMUSCULAR; INTRAVENOUS at 13:52

## 2018-04-09 RX ADMIN — DEXAMETHASONE SODIUM PHOSPHATE 4 MG: 4 INJECTION, SOLUTION INTRA-ARTICULAR; INTRALESIONAL; INTRAMUSCULAR; INTRAVENOUS; SOFT TISSUE at 14:01

## 2018-04-09 RX ADMIN — PROPOFOL 140 MCG/KG/MIN: 10 INJECTION, EMULSION INTRAVENOUS at 13:46

## 2018-04-09 RX ADMIN — MIDAZOLAM HYDROCHLORIDE 1 MG: 1 INJECTION, SOLUTION INTRAMUSCULAR; INTRAVENOUS at 13:44

## 2018-04-09 RX ADMIN — MIDAZOLAM HYDROCHLORIDE 1 MG: 1 INJECTION, SOLUTION INTRAMUSCULAR; INTRAVENOUS at 13:43

## 2018-04-09 RX ADMIN — FENTANYL CITRATE 25 MCG: 50 INJECTION, SOLUTION INTRAMUSCULAR; INTRAVENOUS at 14:00

## 2018-04-09 RX ADMIN — SODIUM CHLORIDE, SODIUM LACTATE, POTASSIUM CHLORIDE, AND CALCIUM CHLORIDE 25 ML/HR: 600; 310; 30; 20 INJECTION, SOLUTION INTRAVENOUS at 13:40

## 2018-04-09 RX ADMIN — FENTANYL CITRATE 25 MCG: 50 INJECTION, SOLUTION INTRAMUSCULAR; INTRAVENOUS at 13:46

## 2018-04-09 RX ADMIN — PROPOFOL 50 MG: 10 INJECTION, EMULSION INTRAVENOUS at 13:46

## 2018-04-09 RX ADMIN — CEFAZOLIN 2 G: 1 INJECTION, POWDER, FOR SOLUTION INTRAMUSCULAR; INTRAVENOUS; PARENTERAL at 13:47

## 2018-04-09 RX ADMIN — FENTANYL CITRATE 25 MCG: 50 INJECTION, SOLUTION INTRAMUSCULAR; INTRAVENOUS at 14:08

## 2018-04-09 RX ADMIN — ONDANSETRON 4 MG: 2 INJECTION INTRAMUSCULAR; INTRAVENOUS at 14:01

## 2018-04-09 NOTE — INTERVAL H&P NOTE
H&P Update:  Loree Booker was seen and examined. History and physical has been reviewed. The patient has been examined.  There have been no significant clinical changes since the completion of the originally dated History and Physical.    Signed By: Ruben Duque MD     April 9, 2018 1:43 PM

## 2018-04-09 NOTE — ANESTHESIA POSTPROCEDURE EVALUATION
Post-Anesthesia Evaluation and Assessment    Patient: Rodríguez Paredes MRN: [id number]  SSN: [id number]    YOB: 2000  Age: 21 y.o. Sex: female       Cardiovascular Function/Vital Signs  Visit Vitals    /88 (BP 1 Location: Right arm, BP Patient Position: At rest)    Pulse 87    Temp 36.5 °C (97.7 °F)    Resp 14    Ht 5' 7.5\" (1.715 m)    Wt 52.6 kg (116 lb)    SpO2 100%    BMI 17.9 kg/m2       Patient is status post MAC, general - backup anesthesia for Procedure(s):  LEFT BREAST EXCISIONAL BIOPSY (CHOICE/MAC)  (LATEX ALLERGY). Nausea/Vomiting: None    Postoperative hydration reviewed and adequate. Pain:  Pain Scale 1: Numeric (0 - 10) (04/09/18 1502)  Pain Intensity 1: 3 (04/09/18 1502)   Managed    Neurological Status:   Neuro (WDL): Within Defined Limits (04/09/18 1502)  Neuro  Neurologic State: Drowsy; Eyes open spontaneously (04/09/18 1442)   At baseline    Mental Status and Level of Consciousness: Arousable    Pulmonary Status:   O2 Device: Room air (04/09/18 1502)   Adequate oxygenation and airway patent    Complications related to anesthesia: None    Post-anesthesia assessment completed.  No concerns    Signed By: Aisha Cali MD     April 9, 2018

## 2018-04-09 NOTE — ANESTHESIA PREPROCEDURE EVALUATION
Anesthetic History   No history of anesthetic complications            Review of Systems / Medical History  Patient summary reviewed, nursing notes reviewed and pertinent labs reviewed    Pulmonary  Within defined limits                 Neuro/Psych         Psychiatric history (anxiety/ depression)     Cardiovascular              Complex congenital heart defect (h/o ASD, s/p repair 2003)    Exercise tolerance: >4 METS     GI/Hepatic/Renal     GERD (occasionally)           Endo/Other        Arthritis     Other Findings   Comments: Lupus with chronic joint pain            Physical Exam    Airway  Mallampati: I  TM Distance: 4 - 6 cm  Neck ROM: normal range of motion   Mouth opening: Normal     Cardiovascular    Rhythm: regular  Rate: normal      Pertinent negatives: No murmur   Dental  No notable dental hx       Pulmonary  Breath sounds clear to auscultation               Abdominal  GI exam deferred       Other Findings            Anesthetic Plan    ASA: 2  Anesthesia type: MAC and general - backup          Induction: Intravenous  Anesthetic plan and risks discussed with: Patient

## 2018-04-09 NOTE — PERIOP NOTES
Reviewed discharge instructions w/pt. And family. They verbalized understanding. Vss.  C/o soreness to left breast, level 3/10. Pt. States is tolerable. dermabond to left breast intact. Lungs clear. Pt. And family stated ready for discharge. Pt discharged via wheelchair, accompanied by RN. Pt discharged awake and alert, respirations equal and unlabored, skin warm, dry, and intact. Pt and family members' questions and concerns addressed prior to discharge.

## 2018-04-09 NOTE — PERIOP NOTES
Jero Momin  2000  [id number]    Situation:  Verbal report given from: Bakari Chance CRNA, Nico Ya RN  Procedure: Procedure(s):  LEFT BREAST EXCISIONAL BIOPSY (CHOICE/MAC)  (LATEX ALLERGY)    Background:    Preoperative diagnosis: LEFT BREAST MASS     Postoperative diagnosis: LEFT BREAST MASS     :  Dr. Nathaly Charles    Assistant(s): Circ-1: Antonino Cornelius RN  Scrub Tech-1: Robert Lam  Surg Asst-1: Janice Russo    Specimens:   ID Type Source Tests Collected by Time Destination   1 : left breast mass at 2 o'clock Preservative Breast  Danny Anders MD 4/9/2018 1408 Pathology       Assessment:  Intra-procedure medications         Anesthesia gave intra-procedure sedation and medications, see anesthesia flow sheet     Intravenous fluids: LR@ KVO     Vital signs stable       Recommendation:    Permission to share finding with parents : yes

## 2018-04-09 NOTE — DISCHARGE INSTRUCTIONS
Discharge Instructions:  Excisional breast biopsy  Dr. Rosalina Walden    Call for appointment for follow up in 1 week [phone]    Activity:    Walk regularly. You may resume driving in 24 hours unless still requiring narcotics for pain. It is ok to use the arm on side of surgery but do not over do it. Work:    You may return to work in 3-7 days to light activity. No lifting more than 10 pounds for three weeks. Diet:    You may resume normal diet after 24 hours. Anesthesia and narcotics may cause nausea and vomiting. If persistent please call the office. Wound Care: You have a special dressing called Dermabond. It is okay to shower and let the water run over the incision but do not scrub the area or soak in a tub. If you have a small amount of drainage you may place a dry bandage over the wound and change it daily. If you experience a lot of drainage, develop redness around the wound, or a fever over 101 F occurs please call the office. Wear bra to support breast even at night to support the breast during the first week after surgery. Medications:    Resume home medications as indicated on the Medical Reconciliation form. Aspirin, Coumadin, and Plavix can be restarted on post operative day 2 if you were taking them preoperatively. Pain medications:  Non steroidal antiinflammatories seem to work best for post surgical pain. Try these first as prescribed. A narcotic prescription will also be given for breakthrough pain. Over the counter stool softeners and laxatives may be used if needed. Do not hesitate to call with questions or concerns. DO NOT TAKE TYLENOL/ACETAMINOPHEN WITH PERCOCET, [facility], [address] TAKE NARCOTIC PAIN MEDICATIONS WITH FOOD     If given 2 pain narcotics do NOT take together! Narcotics tend to be constipating, we suggest taking a stool softener such as Colace or Miralax (follow package instructions).     DO NOT DRIVE WHILE TAKING NARCOTIC PAIN MEDICATIONS. DO NOT TAKE SLEEPING MEDICATIONS OR ANTIANXIETY MEDICATIONS WHILE TAKING NARCOTIC PAIN MEDICATIONS,  ESPECIALLY THE NIGHT OF ANESTHESIA. CPAP PATIENTS BE SURE TO WEAR MACHINE WHENEVER NAPPING OR SLEEPING. DISCHARGE SUMMARY from Nurse    The following personal items collected during your admission are returned to you:   Dental Appliance: Dental Appliances: None  Vision: Visual Aid: Glasses, With patient  Hearing Aid:    Aurther Chanelle: Jewelry: Body Piercing (lip ring removed; pt has replaced her other piercings with plastic studs; one ring in her nose left in, as it was new; a piece of tape placed  over her nose ring)  Clothing: Clothing: Other (comment) (belonging bag)  Other Valuables: Other Valuables: Other (comment) (jewelry given to mom)  Valuables sent to safe:        PATIENT INSTRUCTIONS:    After General Anesthesia or Intravenous Sedation, for 24 hours or while taking prescription Narcotics:        Someone should be with you for the next 24 hours. For your own safety, a responsible adult must drive you home. · Limit your activities  · Recommended activity: Rest today, up with assistance today. Do not climb stairs or shower unattended for the next 24 hours. · Please start with a soft bland diet and advance as tolerated (no nausea) to regular diet. · If you have a sore throat you should try the following: fluids, warm salt water gargles, or throat lozenges. If it does not improve after several days please follow up with your primary physician. · Do not drive and operate hazardous machinery  · Do not make important personal or business decisions  · Do  not drink alcoholic beverages  · If you have not urinated within 8 hours after discharge, please contact your surgeon on call.     Report the following to your surgeon:  · Excessive pain, swelling, redness or odor of or around the surgical area  · Temperature over 100.5  · Nausea and vomiting lasting longer than 4 hours or if unable to take medications  · Any signs of decreased circulation or nerve impairment to extremity: change in color, persistent  numbness, tingling, coldness or increase pain      · You will receive a Post Operative Call from one of the Recovery Room Nurses on the day after your surgery to check on you. It is very important for us to know how you are recovering after your surgery. If you have an issue or need to speak with someone, please call your surgeon, do not wait for the post operative call. · You may receive an e-mail or letter in the mail from [place] regarding your experience with us in the Ambulatory Surgery Unit. Your feedback is valuable to us and we appreciate your participation in the survey. · If the above instructions are not adequate, please contact Herminio Simmons RN, [facility] anesthesia Nurse Manager or our Anesthesiologist, at [phone]. If you are having problems after your surgery, call the physician at their office number. · We wish you a speedy recovery ? What to do at Home:      *  Please give a list of your current medications to your Primary Care Provider. *  Please update this list whenever your medications are discontinued, doses are      changed, or new medications (including over-the-counter products) are added. *  Please carry medication information at all times in case of emergency situations. These are general instructions for a healthy lifestyle:    No smoking/ No tobacco products/ Avoid exposure to second hand smoke    Surgeon General's Warning:  Quitting smoking now greatly reduces serious risk to your health.     Obesity, smoking, and sedentary lifestyle greatly increases your risk for illness    A healthy diet, regular physical exercise & weight monitoring are important for maintaining a healthy lifestyle    You may be retaining fluid if you have a history of heart failure or if you experience any of the following symptoms:  Weight gain of 3 pounds or more overnight or 5 pounds in a week, increased swelling in our hands or feet or shortness of breath while lying flat in bed. Please call your doctor as soon as you notice any of these symptoms; do not wait until your next office visit. Recognize signs and symptoms of STROKE:    B - Balance  E - Eyes    F-  Face looks uneven    A-  Arms unable to move or move even    S-  Speech slurred or non-existent    T-  Time-call 911 as soon as signs and symptoms begin-DO NOT go       Back to bed or wait to see if you get better-TIME IS BRAIN. If you have not received your influenza and/or pneumococcal vaccine, please follow up with your primary care physician. The discharge information has been reviewed with the patient and caregiver. The patient and caregiver verbalized understanding. Montez Farrell THROMBOSIS AND PULMONARY EMBOLUS    SURGICAL PATIENTS  Surgical patients are the #1 risk for DVT and PE. WHAT IS DVT? WHAT IS PE?  DVT is a serious condition where blood clots develop deep in the veins of the legs. PE occurs when a blood clot breaks loose from the wall of a vein and travels to the lungs blocking the pulmonary artery or one of its branches impairing blood flow from the heart, which could result in death.   RISK FACTORS   Surgery lasting longer than 45 minutes   History of inflammatory bowel disease   Oral contraceptive or hormone replacement therapy   Immobilization   Varicose veins / swollen legs   Smoking    CHF / Acute MI / Irregular heart beat   Family history of thrombosis   General anesthesia greater than 2 hours   Obesity   Infection of less than one month   Less than 1 month postpartum   COPD / Pneumonia   Arthroscopic surgery   Malignancy / cancer   Spine surgery   Blood abnormalities   Stroke / Paralysis / Coma    SIGNS AND SYMPTOMS OF DEEP VEIN TROMBOSIS  Usually occurs in one leg, above or below the knee   Swelling - one calf or thigh may be larger than the other   Feeling increased warmth in the area of the leg that is swollen or painful   Leg pain, which may increase when standing or walking   Swelling along the vein of the leg   When swollen areas is pressed with a finger, a depression may remain   Tenderness of the leg that may be confined to one area   Change in leg color (bluish or red)    SIGNS AND SYMPTOMS OF PULMONARY EMBOLUS   Chest pain that gets worse with deep breath, coughing or chest movement   Coughing up blood   Sweating   Shortness of breath or difficulty breathing   Rapid heart beat   Lightheadedness    HOW TO REDUCE THE POSSIBLE RISK OF DVT   Exercise - simple activities as rotating ankles and wrists, wiggling toes and fingers, tightening and relaxing muscles in calves and thighs promotes circulation while recovering from surgery. Please do these exercises every hour during waking hours   Take mediation as prescribed by your physician (Lovenox, Coumadin, Aspirin)   Resume your normal activities as soon as your doctor advises you to do so.  Remember, when traveling, to [place] your legs frequently.       PATIENTS WHO BELIEVE THEY MAY BE EXPERIENCING SIGNS AND SYMPTOMS OF DVT OR PE SHOULD SEEK MEDICAL HELP IMMEDIATELY

## 2018-04-09 NOTE — BRIEF OP NOTE
BRIEF OPERATIVE NOTE    Date of Procedure: 4/9/2018   Preoperative Diagnosis: LEFT BREAST MASS   Postoperative Diagnosis: LEFT BREAST MASS     Procedure(s):  LEFT BREAST EXCISIONAL BIOPSY   Surgeon(s) and Role:     * Lita Boxer, MD - Primary         Assistant Staff: None      Surgical Staff:  Circ-1: Constantino Mcgill RN  Scrub Tech-1: Agusto Beebe  Surg Asst-1: Rafaela Medley  Event Time In   Incision Start 1400   Incision Close 1421     Anesthesia: MAC   Estimated Blood Loss: 10 ml  Specimens:   ID Type Source Tests Collected by Time Destination   1 : left breast mass at 2 o'clock Preservative Breast  Lita Boxer, MD 4/9/2018 1408 Pathology      Findings: mass   Complications: none  Implants: * No implants in log *

## 2018-04-09 NOTE — IP AVS SNAPSHOT
[address] [facility] 
[phone] Patient: Nereyda Donahue MRN: [id number] BA About your hospitalization You were admitted on:  2018 You last received care in the:  [facility] PACU You were discharged on:  2018 Why you were hospitalized Your primary diagnosis was:  Not on File Follow-up Information Follow up With Details Comments Contact Info Bianca Haley MD   [address] [facility] [facility] 
[address] [address] 
[phone] Your Scheduled Appointments 2018 11:20 AM EDT  
POST OP with Solomon Estrada MD  
[facility] Dr. Kip Davis [facility] ([address] [facility]) [address] [place] [address], [address] [facility]  
[phone] Discharge Orders None A check asad indicates which time of day the medication should be taken. My Medications START taking these medications Instructions Each Dose to Equal  
 Morning Noon Evening Bedtime  
 oxyCODONE-acetaminophen 5-325 mg per tablet Commonly known as:  PERCOCET Your last dose was: Your next dose is: Take 1 Tab by mouth every four (4) hours as needed for Pain for up to 20 days. Max Daily Amount: 6 Tabs. 1 Tab CONTINUE taking these medications Instructions Each Dose to Equal  
 Morning Noon Evening Bedtime  
 aspirin 81 mg chewable tablet Your last dose was: Your next dose is: Take 81 mg by mouth daily. 81 mg  
    
   
   
   
  
 doxycycline 100 mg capsule Commonly known as:  Jeaneen Cuna Your last dose was: Your next dose is: Take 100 mg by mouth two (2) times a day. 100 mg  
    
   
   
   
  
 ibuprofen 800 mg tablet Commonly known as:  MOTRIN Your last dose was: Your next dose is: TAKE ONE TABLET BY MOUTH EVERY 8 HOURS AS NEEDED FOR PAIN **GENERIC FOR: MOTRIN  
     
   
   
   
  
 PLAQUENIL 200 mg tablet Generic drug:  hydroxychloroquine Your last dose was: Your next dose is: Take 200 mg by mouth daily. 200 mg Where to Get Your Medications Information on where to get these meds will be given to you by the nurse or doctor. ! Ask your nurse or doctor about these medications  
  oxyCODONE-acetaminophen 5-325 mg per tablet Opioid Education Prescription Opioids: What You Need to Know: 
 
 
Walk regularly. You may resume driving in 24 hours unless still requiring narcotics for pain. It is ok to use the arm on side of surgery but do not over do it. Work: 
 
You may return to work in 3-7 days to light activity. No lifting more than 10 pounds for three weeks. Diet: 
 
You may resume normal diet after 24 hours. Anesthesia and narcotics may cause nausea and vomiting. If persistent please call the office. Wound Care: You have a special dressing called Dermabond. It is okay to shower and let the water run over the incision but do not scrub the area or soak in a tub. If you have a small amount of drainage you may place a dry bandage over the wound and change it daily. If you experience a lot of drainage, develop redness around the wound, or a fever over 101 F occurs please call the office. Wear bra to support breast even at night to support the breast during the first week after surgery. Medications: 
 
Resume home medications as indicated on the Medical Reconciliation form. Aspirin, Coumadin, and Plavix can be restarted on post operative day 2 if you were taking them preoperatively. Pain medications:  Non steroidal antiinflammatories seem to work best for post surgical pain. Try these first as prescribed. A narcotic prescription will also be given for breakthrough pain. Over the counter stool softeners and laxatives may be used if needed. Do not hesitate to call with questions or concerns. DO NOT TAKE TYLENOL/ACETAMINOPHEN WITH PERCOCET, LORTAB, [address] TAKE NARCOTIC PAIN MEDICATIONS WITH FOOD If given 2 pain narcotics do NOT take together! Narcotics tend to be constipating, we suggest taking a stool softener such as Colace or Miralax (follow package instructions). DO NOT DRIVE WHILE TAKING NARCOTIC PAIN MEDICATIONS. DO NOT TAKE SLEEPING MEDICATIONS OR ANTIANXIETY MEDICATIONS WHILE TAKING NARCOTIC PAIN MEDICATIONS,  ESPECIALLY THE NIGHT OF ANESTHESIA. CPAP PATIENTS BE SURE TO WEAR MACHINE WHENEVER NAPPING OR SLEEPING. DISCHARGE SUMMARY from Nurse The following personal items collected during your admission are returned to you:  
Dental Appliance: Dental Appliances: None Vision: Visual Aid: Glasses, With patient Hearing Aid:   
Jewelry: Jewelry: Body Piercing (lip ring removed; pt has replaced her other piercings with plastic studs; one ring in her nose left in, as it was new; a piece of tape placed  over her nose ring) Clothing: Clothing: Other (comment) (belonging bag) Other Valuables: Other Valuables: Other (comment) (jewelry given to mom) Valuables sent to safe:   
 
 
PATIENT INSTRUCTIONS: 
 
 
B - Balance E - Eyes F-  Face looks uneven A-  Arms unable to move or move even S-  Speech slurred or non-existent T-  Time-call 911 as soon as signs and symptoms begin-DO NOT go Back to bed or wait to see if you get better-TIME IS BRAIN. If you have not received your influenza and/or pneumococcal vaccine, please follow up with your primary care physician. The discharge information has been reviewed with the patient and caregiver. The patient and caregiver verbalized understanding. [address] THROMBOSIS AND PULMONARY EMBOLUS 
 
SURGICAL PATIENTS Surgical patients are the #1 risk for DVT and PE. WHAT IS DVT?  WHAT IS PE? 
DVT is a serious condition where blood clots develop deep in the veins of the legs. PE occurs when a blood clot breaks loose from the wall of a vein and travels to the lungs blocking the pulmonary artery or one of its branches impairing blood flow from the heart, which could result in death. RISK FACTORS 
? Surgery lasting longer than 45 minutes ? History of inflammatory bowel disease 
? Oral contraceptive or hormone replacement therapy ? Immobilization ? Varicose veins / swollen legs ? Smoking  
? CHF / Acute MI / Irregular heart beat ? Family history of thrombosis ? General anesthesia greater than 2 hours ? Obesity ? Infection of less than one month ? Less than 1 month postpartum 
? COPD / Pneumonia ? Arthroscopic surgery ? Malignancy / cancer ? Spine surgery ? Blood abnormalities ? Stroke / Paralysis / Coma SIGNS AND SYMPTOMS OF DEEP VEIN TROMBOSIS Usually occurs in one leg, above or below the knee ? Swelling  one calf or thigh may be larger than the other ? Feeling increased warmth in the area of the leg that is swollen or painful ? Leg pain, which may increase when standing or walking ? Swelling along the vein of the leg ? When swollen areas is pressed with a finger, a depression may remain ? Tenderness of the leg that may be confined to one area ? Change in leg color (bluish or red) SIGNS AND SYMPTOMS OF PULMONARY EMBOLUS 
? Chest pain that gets worse with deep breath, coughing or chest movement ? Coughing up blood ? Sweating ? Shortness of breath or difficulty breathing ? Rapid heart beat ? Lightheadedness HOW TO REDUCE THE POSSIBLE RISK OF DVT ? Exercise  simple activities as rotating ankles and wrists, wiggling toes and fingers, tightening and relaxing muscles in calves and thighs promotes circulation while recovering from surgery. Please do these exercises every hour during waking hours ? Take mediation as prescribed by your physician (Lovenox, Coumadin, Aspirin) ?  Resume your normal activities as soon as your doctor advises you to do so. 
? Remember, when traveling, to [place] your legs frequently. PATIENTS WHO BELIEVE THEY MAY BE EXPERIENCING SIGNS AND SYMPTOMS OF DVT OR PE SHOULD SEEK MEDICAL HELP IMMEDIATELY Introducing [facility]! [employer] introduces [employer] patient portal. Now you can access parts of your medical record, email your doctor's office, and request medication refills online. 1. In your internet browser, go to [email or web address] 2. Click on the First Time User? Click Here link in the Sign In box. You will see the New Member Sign Up page. 3. Enter your [employer] Access Code exactly as it appears below. You will not need to use this code after youve completed the sign-up process. If you do not sign up before the expiration date, you must request a new code. · [employer] Access Code: [email or web address] Expires: 5/8/2018  3:34 PM 
 
4. Enter the last four digits of your Social Security Number (xxxx) and Date of Birth (mm/dd/yyyy) as indicated and click Submit. You will be taken to the next sign-up page. 5. Create a [employer] ID. This will be your [employer] login ID and cannot be changed, so think of one that is secure and easy to remember. 6. Create a [employer] password. You can change your password at any time. 7. Enter your Password Reset Question and Answer. This can be used at a later time if you forget your password. 8. Enter your e-mail address. You will receive e-mail notification when new information is available in [address] 9. Click Sign Up. You can now view and download portions of your medical record. 10. Click the Download Summary menu link to download a portable copy of your medical information. If you have questions, please visit the Frequently Asked Questions section of the [employer] website. Remember, [employer] is NOT to be used for urgent needs. For medical emergencies, dial 911. Now available from your iPhone and Android! Introducing Gigi Flores As a [employer] patient, I wanted to make you aware of our electronic visit tool called Gigi Flores. [employer] 24/7 allows you to connect within minutes with a medical provider 24 hours a day, seven days a week via a mobile device or tablet or logging into a secure website from your computer. You can access Gigi Flores from anywhere in the [place]. A virtual visit might be right for you when you have a simple condition and feel like you just dont want to get out of bed, or cant get away from work for an appointment, when your regular [employer] provider is not available (evenings, weekends or holidays), or when youre out of town and need minor care. Electronic visits cost only $49 and if the [employer] 24/7 provider determines a prescription is needed to treat your condition, one can be electronically transmitted to a nearby pharmacy*. Please take a moment to enroll today if you have not already done so. The enrollment process is free and takes just a few minutes. To enroll, please download the [employer] 24/7 george to your tablet or phone, or visit [email or web address] to enroll on your computer. And, as an [address] [place] [address] patient with a [employer] account, the results of your visits will be scanned into your electronic medical record and your primary care provider will be able to view the scanned results. We urge you to continue to see your regular [employer] provider for your ongoing medical care. And while your primary care provider may not be the one available when you seek a Gigi Flores virtual visit, the peace of mind you get from getting a real diagnosis real time can be priceless. For more information on Gigi Flores, view our Frequently Asked Questions (FAQs) at [email or web address]. Sincerely, 
 
Jian Brown MD 
Chief Medical Officer [facility] *:  certain medications cannot be prescribed via Gigi Flores Providers Seen During Your Hospitalization Provider Specialty Primary office phone Danny Anders MD General Surgery [phone] Your Primary Care Physician (PCP) Primary Care Physician Office Phone Office Fax Dawit Malone [phone] You are allergic to the following Allergen Reactions Latex Rash Recent Documentation Height Weight BMI OB Status Smoking Status 1.715 m (90 %, Z= 1.26)* 52.6 kg (26 %, Z= -0.64)* 17.9 kg/m2 (5 %, Z= -1.60)* Having regular periods Never Smoker *Growth percentiles are based on [facility] 2-20 Years data. Emergency Contacts Name Discharge Info Relation Home Work Mobile [phone] CAREGIVER [3] Parent [1] [phone] Patient Belongings The following personal items are in your possession at time of discharge: 
  Dental Appliances: None  Visual Aid: Glasses, With patient      Home Medications: None   Jewelry: Body Piercing (lip ring removed; pt has replaced her other piercings with plastic studs; one ring in her nose left in, as it was new; a piece of tape placed  over her nose ring)  Clothing: Other (comment) (belonging bag)    Other Valuables: Other (comment) (jewelry given to mom) Discharge Instructions Attachments/References MEFS - OXYCODONE/ACETAMINOPHEN (PERCOCET, ROXICET) - (BY MOUTH) (ENGLISH) Patient Handouts Oxycodone/Acetaminophen (Percocet, Roxicet) - (By mouth) Why this medicine is used:  
Treats pain. This medicine contains a narcotic pain reliever. Contact a nurse or doctor right away if you have: 
· Extreme weakness, shallow breathing, slow heartbeat · Sweating or cold, clammy skin · Skin blisters, rash, or peeling Common side effects: 
· Constipation · Nausea, vomiting · Tiredness © 2017 [address] Information is for End User's use only and may not be sold, redistributed or otherwise used for commercial purposes. Please provide this summary of care documentation to your next provider. Signatures-by signing, you are acknowledging that this After Visit Summary has been reviewed with you and you have received a copy. Patient Signature:  ____________________________________________________________ Date:  ____________________________________________________________  
  
Kaleb Ala Provider Signature:  ____________________________________________________________ Date:  ____________________________________________________________

## 2018-04-09 NOTE — H&P (VIEW-ONLY)
HISTORY OF PRESENT ILLNESS  Charisse Mcgill is a 21 y.o. female who comes in for consultation by Dr Kamron Barreto for a breast mass  Surgical Follow-up   Associated symptoms include headaches and shortness of breath. Pertinent negatives include no chest pain and no abdominal pain. She has noted a lump in the UOQ of the left breast for 4-5 months. It has not changed in size and is slightly tender. The size nor discomfort does not vary with menstrual cycle. She denies family hx breast cancer, ovarian cancer. She had menarche at 3, and is nulliparous. Her LMP was 2/1. US core bx 3/2/2018 demonstrated a fibroadenoma. Past Medical History:   Diagnosis Date    Arthritis     Autoimmune disease ([address]     Chronic pain     Psychotic disorder      Past Surgical History:   Procedure Laterality Date    BREAST SURGERY PROCEDURE UNLISTED  03/02/2018    Breast bx     HX ATRIAL SEPTAL DEFECT REPAIR  2003     Family History   Problem Relation Age of Onset   Molly Martin Stroke Mother     Hypertension Sister      Social History   Substance Use Topics    Smoking status: Never Smoker    Smokeless tobacco: Never Used    Alcohol use No     Current Outpatient Prescriptions   Medication Sig    doxycycline (MONODOX) 100 mg capsule Take 100 mg by mouth two (2) times a day.  ibuprofen (MOTRIN) 800 mg tablet TAKE ONE TABLET BY MOUTH EVERY 8 HOURS AS NEEDED FOR PAIN **GENERIC FOR: MOTRIN    aspirin 81 mg chewable tablet Take 81 mg by mouth daily.  hydroxychloroquine (PLAQUENIL) 200 mg tablet Take 200 mg by mouth daily. No current facility-administered medications for this visit. Allergies   Allergen Reactions    Latex Rash       Review of Systems   Constitutional: Negative for chills, diaphoresis, fever, malaise/fatigue and weight loss. HENT: Negative for congestion, ear pain and sore throat. Eyes: Negative for blurred vision and pain. Respiratory: Positive for shortness of breath.  Negative for cough, hemoptysis, sputum production, wheezing and stridor. Cardiovascular: Negative for chest pain, palpitations, orthopnea, claudication, leg swelling and PND. Gastrointestinal: Positive for heartburn. Negative for abdominal pain, blood in stool, constipation, diarrhea, melena, nausea and vomiting. Genitourinary: Negative for dysuria, flank pain, frequency, hematuria and urgency. Musculoskeletal: Positive for myalgias. Negative for back pain, joint pain and neck pain. Skin: Negative for itching and rash. Neurological: Positive for headaches. Negative for dizziness, tremors, focal weakness, seizures and weakness. Endo/Heme/Allergies: Negative for polydipsia. Psychiatric/Behavioral: Negative for depression and memory loss. The patient is nervous/anxious. Visit Vitals    /79 (BP 1 Location: Right arm, BP Patient Position: Sitting)    Pulse 79    Temp 96 °F (35.6 °C) (Oral)       Physical Exam   Constitutional: She is oriented to person, place, and time. She appears well-developed and well-nourished. No distress. HENT:   Head: Normocephalic and atraumatic. Mouth/Throat: Oropharynx is clear and moist. No oropharyngeal exudate. Eyes: Conjunctivae and EOM are normal. Pupils are equal, round, and reactive to light. No scleral icterus. Neck: Normal range of motion. Neck supple. No JVD present. No tracheal deviation present. No thyromegaly present. Cardiovascular: Normal rate and regular rhythm. Exam reveals no gallop and no friction rub. No murmur heard. Pulmonary/Chest: Effort normal and breath sounds normal. No respiratory distress. She has no wheezes. She has no rales. Right breast exhibits no inverted nipple, no mass, no nipple discharge, no skin change and no tenderness. Left breast exhibits mass (5 cm mobile mass in UOQ). Left breast exhibits no inverted nipple, no nipple discharge, no skin change and no tenderness. Breasts are symmetrical (medium, ptotic). Abdominal: Soft.  Bowel sounds are normal. She exhibits no distension and no mass. There is no tenderness. There is no rebound and no guarding. Musculoskeletal: Normal range of motion. She exhibits no edema. Lymphadenopathy:     She has no cervical adenopathy. She has axillary adenopathy (shoddy bilaterally ). Right: No supraclavicular adenopathy present. Left: No supraclavicular adenopathy present. Neurological: She is alert and oriented to person, place, and time. No cranial nerve deficit. Skin: Skin is warm and dry. No rash noted. She is not diaphoretic. No erythema. No pallor. Psychiatric: Her speech is normal and behavior is normal. Judgment and thought content normal. Her mood appears anxious. ASSESSMENT and PLAN  1. Left breast mass 0200 biopsy proven fibroadenoma. It is causing her pain and she desires removal.  Risks of removal include, but are not limited to, bleeding, infection, recurrence, poor healing/cosmesis, as well as usual risks of anesthesia.   2.  Anxiety    She desires a left breast excisional biopsy under MAC/general per her choice as an outpatient    Swetha Metz MD FACS

## 2018-04-09 NOTE — OP NOTES
[facility] OP NOTE    Thom Santos  MR#: [id number]  : 2000  ACCOUNT #: [de-identified]   DATE OF SERVICE: 2018    PREOPERATIVE DIAGNOSIS:  Left breast fibroadenoma at 2 o'clock. POSTOPERATIVE DIAGNOSIS:  Left breast fibroadenoma at 2 o'clock. PROCEDURE PERFORMED:  Left breast excisional biopsy. SURGEON:  Ang Clarke. Halley Lowe MD.    Brooke Quezada. ANESTHESIA:  MAC.    ESTIMATED BLOOD LOSS:  10 mL. COMPLICATIONS:  None. SPECIMENS REMOVED:  Left breast mass at 2 o'clock. IMPLANTS:  None. BRIEF HISTORY OF PRESENT ILLNESS:  The patient is a 12-year-old female with a 4 cm fibroadenoma in her left breast, biopsy proven. It is getting larger and more painful. She wanted to have the area excised and now presents for that. The patient understands the risks and benefits, and these are outlined in my office notes. DESCRIPTION OF PROCEDURE:  The patient was taken to the operating room and placed on the operating room table in the supine position and underwent IV sedation, and the left breast was prepped and draped in the usual sterile fashion. It was near the axillary tail. For that reason, I used one of Lali's lines toward the axillary tail and anesthetized the area with 1% lidocaine with epinephrine and sodium bicarbonate and then made an incision along the Lali's line and dissected out to the mass and then sharply excised it. A combination of sharp dissection and electrocautery removed the mass. Electrocautery was used to control bleeding of the wound. Then, 0.5% Marcaine was infiltrated in the skin and subcutaneous tissues as well and the area around the lesion, and we reassured that there was no evidence of ongoing bleeding. Interrupted 3-0 Vicryl was used to approximate the deep tissues and deep dermis, and running 4-0 Vicryl was used to close the skin. Next, a dressing was then applied.   Upon completion of the procedure, the needle, sponge and instrument counts were correct x2. The patient had tolerated the procedure well and was brought to recovery room. MD Garcia Moralez / [place]  D: 04/09/2018 14:31     T: 04/09/2018 15:11  JOB #: [id number]  CC: Pao Hoff MD

## 2018-04-18 ENCOUNTER — OFFICE VISIT (OUTPATIENT)
Dept: SURGERY | Age: 18
End: 2018-04-18

## 2018-04-18 VITALS
SYSTOLIC BLOOD PRESSURE: 125 MMHG | BODY MASS INDEX: 18.26 KG/M2 | OXYGEN SATURATION: 99 % | RESPIRATION RATE: 14 BRPM | DIASTOLIC BLOOD PRESSURE: 81 MMHG | TEMPERATURE: 96.7 F | WEIGHT: 120.5 LBS | HEART RATE: 75 BPM | HEIGHT: 68 IN

## 2018-04-18 DIAGNOSIS — Z09 POSTOPERATIVE EXAMINATION: Primary | ICD-10-CM

## 2018-04-18 NOTE — PROGRESS NOTES
Surgery  Follow up  Procedure: left breast excisional biopsy  OR date:  4/9/2018  Path:       Left breast mass at 2:00, excisional biopsy:   Fibroadenoma (4.5 cm)   Benign ductal calcifications, focal     S I have some pain, no drainage    Visit Vitals    /81 (BP 1 Location: Right arm, BP Patient Position: Sitting)    Pulse 75    Temp 96.7 °F (35.9 °C) (Oral)    Resp 14    Ht 5' 7.5\" (1.715 m)    Wt 54.7 kg (120 lb 8 oz)    LMP 04/01/2018    SpO2 99%    BMI 18.59 kg/m2       O Incisions healing well without infection   Moderate seroma    A/P Doing well   RTC prn    Minh Franks MD FACS

## 2018-04-18 NOTE — MR AVS SNAPSHOT
[address], [address] [facility], [address] [place] [address] [facility] 
[phone] Patient: Iftikhar Mathur MRN: [id number] Visit Information Date & Time Provider Department Dept. Phone Encounter #  
 4/18/2018  3:00 PM Mulugeta Swift MD [facility] [id number] Your Appointments 4/23/2018 11:00 AM  
POST OP with Mulugeta Swift MD  
[facility] Dr. Kip Davis [facility] ([facility]) Appt Note: post op Lt breast excisional biopsy 04/09/2018; 90 day global; .  
 [address] [facility], [address] [facility], [address] [phone]  
[address], [address] [facility], [address] [place] [address] [address] [phone] Upcoming Health Maintenance Date Due Hepatitis A Peds Age 1-18 (1 of 2 - Standard Series) 7/23/1999 DTaP/Tdap/Td series (1 - Tdap) 7/23/2005 HPV Age 9Y-34Y (1 of 3 - Female 3 Dose Series) 7/23/2009 Influenza Age 5 to Adult 8/1/2017 Allergies as of 4/18/2018  Review Complete On: 4/18/2018 By: Mulugeta Swift MD  
  
 Severity Noted Reaction Type Reactions Latex  02/09/2018    Rash Current Immunizations  Never Reviewed No immunizations on file. Not reviewed this visit You Were Diagnosed With   
  
 Codes Comments Postoperative examination    -  Primary ICD-10-CM: M07 ICD-9-CM: V67.00 Vitals BP Pulse Temp Resp Height(growth percentile) Weight(growth percentile) 125/81 (90 %/ 93 %)* (BP 1 Location: Right arm, BP Patient Position: Sitting) 75 96.7 °F (35.9 °C) (Oral) 14 5' 7.5\" (1.715 m) (90 %, Z= 1.26) 120 lb 8 oz (54.7 kg) (35 %, Z= -0.38) LMP SpO2 BMI OB Status Smoking Status 04/01/2018 99% 18.59 kg/m2 (11 %, Z= -1.22) Having regular periods Never Smoker *BP percentiles are based on NHBPEP's 4th Report Growth percentiles are based on CDC 2-20 Years data. Vitals History BMI and BSA Data Body Mass Index Body Surface Area 18.59 kg/m 2 1.61 m 2 Preferred Pharmacy Pharmacy Name Phone [facility] [address] [phone] Your Updated Medication List  
  
   
This list is accurate as of 4/18/18  3:52 PM.  Always use your most recent med list.  
  
  
  
  
 aspirin 81 mg chewable tablet Take 81 mg by mouth daily. ibuprofen 800 mg tablet Commonly known as:  MOTRIN  
TAKE ONE TABLET BY MOUTH EVERY 8 HOURS AS NEEDED FOR PAIN **GENERIC FOR: MOTRIN  
  
 oxyCODONE-acetaminophen 5-325 mg per tablet Commonly known as:  PERCOCET Take 1 Tab by mouth every four (4) hours as needed for Pain for up to 20 days. Max Daily Amount: 6 Tabs. PLAQUENIL 200 mg tablet Generic drug:  hydroxychloroquine Take 200 mg by mouth daily. ZINC PO Take  by mouth. Introducing [facility]! [employer] introduces [employer] patient portal. Now you can access parts of your medical record, email your doctor's office, and request medication refills online. 1. In your internet browser, go to [email or web address]. [employer] / [employer]/[facility] 2. Click on the First Time User? Click Here link in the Sign In box. You will see the New Member Sign Up page. 3. Enter your [employer] Access Code exactly as it appears below. You will not need to use this code after youve completed the sign-up process. If you do not sign up before the expiration date, you must request a new code. · [employer] Access Code: [email or web address] Expires: 5/8/2018  3:34 PM 
 
4. Enter the last four digits of your Social Security Number (xxxx) and Date of Birth (mm/dd/yyyy) as indicated and click Submit. You will be taken to the next sign-up page. 5. Create a [employer] ID. This will be your [employer] login ID and cannot be changed, so think of one that is secure and easy to remember. 6. Create a [employer] password. You can change your password at any time. 7. Enter your Password Reset Question and Answer. This can be used at a later time if you forget your password. 8. Enter your e-mail address. You will receive e-mail notification when new information is available in [address] 9. Click Sign Up. You can now view and download portions of your medical record. 10. Click the Download Summary menu link to download a portable copy of your medical information. If you have questions, please visit the Frequently Asked Questions section of the [employer] website. Remember, [employer] is NOT to be used for urgent needs. For medical emergencies, dial 911. Now available from your iPhone and Android! Please provide this summary of care documentation to your next provider. Your primary care clinician is listed as Nadya Main. If you have any questions after today's visit, please call [phone].

## 2018-04-18 NOTE — PROGRESS NOTES
Chief Complaint   Patient presents with    Surgical Follow-up     excisional biopsy 4/9/2018     1. Have you been to the ER, urgent care clinic since your last visit? Hospitalized since your last visit? No    2. Have you seen or consulted any other health care providers outside of the [address] [place] [address] since your last visit? Include any pap smears or colon screening.  Dr. Nadia Swenson

## (undated) DEVICE — SOLUTION IV 1000ML 0.9% SOD CHL

## (undated) DEVICE — 3M™ TEGADERM™ TRANSPARENT FILM DRESSING FRAME STYLE, 1624W, 2-3/8 IN X 2-3/4 IN (6 CM X 7 CM), 100/CT 4CT/CASE: Brand: 3M™ TEGADERM™

## (undated) DEVICE — STERILE POLYISOPRENE POWDER-FREE SURGICAL GLOVES: Brand: PROTEXIS

## (undated) DEVICE — NEEDLE HYPO 25GA L1.5IN BVL ORIENTED ECLIPSE

## (undated) DEVICE — SUTURE VCRL SZ 4-0 L27IN ABSRB UD L19MM PS-2 3/8 CIR PRIM J426H

## (undated) DEVICE — 1200 GUARD II KIT W/5MM TUBE W/O VAC TUBE: Brand: GUARDIAN

## (undated) DEVICE — SOLUTION LACTATED RINGERS INJECTION USP

## (undated) DEVICE — SUTURE VCRL SZ 3-0 L27IN ABSRB UD L26MM SH 1/2 CIR J416H

## (undated) DEVICE — CONTINU-FLO SOLUTION SET, 2 INJECTION SITES, MALE LUER LOCK ADAPTER WITH RETRACTABLE COLLAR, LARGE BORE STOPCOCK WITH ROTATING MALE LUER LOCK EXTENSION SET, 2 INJECTION SITES, MALE LUER LOCK ADAPTER WITH RETRACTABLE COLLAR: Brand: INTERLINK/CONTINU-FLO

## (undated) DEVICE — SYR 10ML LUER LOK 1/5ML GRAD --

## (undated) DEVICE — CATHETER IV 22GA L1IN TEF FEP STR HUB INTROCAN SFTY

## (undated) DEVICE — ICE PK EYE 4 1/2INX10IN (15/BX 2BX/CS

## (undated) DEVICE — INSULATED BLADE ELECTRODE: Brand: EDGE

## (undated) DEVICE — DERMABOND SKIN ADH 0.7ML -- DERMABOND ADVANCED 12/BX

## (undated) DEVICE — ROCKER SWITCH PENCIL BLADE ELECTRODE, HOLSTER: Brand: EDGE

## (undated) DEVICE — CHEST/BREAST-LF: Brand: MEDLINE INDUSTRIES, INC.

## (undated) DEVICE — LIGHT HANDLE: Brand: DEVON

## (undated) DEVICE — (D)PREP SKN CHLRAPRP APPL 26ML -- CONVERT TO ITEM 371833

## (undated) DEVICE — SUT SLK 2-0SH 30IN BLK --

## (undated) DEVICE — KENDALL DL ECG CABLE AND LEAD WIRE SYSTEM, 3-LEAD, SINGLE PATIENT USE: Brand: KENDALL

## (undated) DEVICE — REM POLYHESIVE ADULT PATIENT RETURN ELECTRODE: Brand: VALLEYLAB

## (undated) DEVICE — INFECTION CONTROL KIT SYS

## (undated) DEVICE — TOWEL SURG W17XL27IN STD BLU COT NONFENESTRATED PREWASHED